# Patient Record
Sex: FEMALE | Race: WHITE | NOT HISPANIC OR LATINO | URBAN - METROPOLITAN AREA
[De-identification: names, ages, dates, MRNs, and addresses within clinical notes are randomized per-mention and may not be internally consistent; named-entity substitution may affect disease eponyms.]

---

## 2018-08-30 ENCOUNTER — EMERGENCY (EMERGENCY)
Facility: HOSPITAL | Age: 63
LOS: 1 days | Discharge: HOME | End: 2018-08-30
Admitting: EMERGENCY MEDICINE
Payer: MEDICAID

## 2018-08-30 VITALS
SYSTOLIC BLOOD PRESSURE: 145 MMHG | DIASTOLIC BLOOD PRESSURE: 81 MMHG | OXYGEN SATURATION: 98 % | HEART RATE: 70 BPM | TEMPERATURE: 98 F | RESPIRATION RATE: 18 BRPM

## 2018-08-30 DIAGNOSIS — F31.9 BIPOLAR DISORDER, UNSPECIFIED: ICD-10-CM

## 2018-08-30 PROCEDURE — 90792 PSYCH DIAG EVAL W/MED SRVCS: CPT

## 2018-08-30 PROCEDURE — 99284 EMERGENCY DEPT VISIT MOD MDM: CPT

## 2018-08-30 RX ADMIN — Medication 1 MILLIGRAM(S): at 16:32

## 2018-08-30 NOTE — ED PROVIDER NOTE - NS ED ROS FT
Yes Denies chest pain, SOB, N/V/D and fevers, Denies palpitations or diaphoresis. Denies Numbness, Tingling, Blurry Vision and HA.   Denies recent falls, trauma and injuries. Denies pain or any other medical complaints.

## 2018-08-30 NOTE — ED PROVIDER NOTE - MEDICAL DECISION MAKING DETAILS
This is a 63 year old Female PMHX MS Bipolar MDD  legally Blind  BIBIA with  Mac  for psych eval r/t suicidal ideations. Patient has had recent change in medication and eliot off lithium and Zyprexa and  changed to Wellbutrin and Prozac. Reports increased depression, suicidal thoughts and stats she lemus not want to be a burden to her family Medical evaluation performed. There is no clinical evidence of intoxication or any acute medical problem requiring immediate intervention. Final disposition will be determined by psychiatrist.

## 2018-08-30 NOTE — ED ADULT NURSE NOTE - OBJECTIVE STATEMENT
Pt received in  c/o psych eval, endorsed SI from alteration in medication, pt denies active SI at this time, denies plan/intent. pt denies HI&AH. Pt is calm and cooperative. denies ETOH and substance use. psych eval ongoing

## 2018-08-30 NOTE — ED PROVIDER NOTE - PROGRESS NOTE DETAILS
ILAN Bach patient anxious shaking Ativan 2mg/Haldol 5mg/Benadryl 50 mg IM x 1 1 mg po x 1 ordered  Labs WNL received via printout Lab results not on computer.

## 2018-08-30 NOTE — ED BEHAVIORAL HEALTH ASSESSMENT NOTE - PATIENT'S CHIEF COMPLAINT
" I don't feel well and I'm afraid I going to die." " I don't feel well and I'm afraid I going to die because of my med change."

## 2018-08-30 NOTE — ED PROVIDER NOTE - OBJECTIVE STATEMENT
This is a 63 year old Female PMHX MS Bipolar MDD  legally Blind  BIBIA with  Mac  for psych eval r/t suicidal ideations. Patient has had recent change in medication and eliot off lithium and Zyprexa and  changed to Wellbutrin and Prozac. Reports increased depression, suicidal thoughts and stats she lemus not want to be a burden to her family. Denies any SI plan or Si attempts Reports [facility] admission in 1992. Denies HI Denies AH/VH Denies ETOH/Illicit drugs

## 2018-08-30 NOTE — ED ADULT TRIAGE NOTE - CHIEF COMPLAINT QUOTE
Pt from home.  Transitioned from lithium to prozac and wellbutrin.  Here for SI.  No HI, drug/alcohol use.  Taking meds as prescribed.  No plan

## 2018-08-30 NOTE — ED BEHAVIORAL HEALTH ASSESSMENT NOTE - DETAILS
Legally blinded Report h/o chronic passive suicidal thoughts ( no intent or plan ) MS and tremors unavailable

## 2018-08-30 NOTE — ED BEHAVIORAL HEALTH ASSESSMENT NOTE - RISK ASSESSMENT
Current low imminent risk given denies any active suicidal ideation, h/o suicide attempt,  homicidal ideation, auditory/visual hallucinations, CAH , or manic sx. She is chronic moderate risk given her limited psychosocial support and proper medical f/u.

## 2018-08-30 NOTE — ED BEHAVIORAL HEALTH ASSESSMENT NOTE - SUMMARY
64 yo CF w/ reported h/o manic depression that was dx in the 80's. Pt states that she recently started seeing a new psychiatrist Dr. Jacques at [facility] and pt states that he switch her from Lithium. Zyprexa, and Trazodone which she had been doing well on and started her on Prozac and Wellbutrin by her new psychiatrist and since then she been more depressed and anxious. Pt denies any active suicidal ideation, h/o suicide attempt,  homicidal ideation, auditory/visual hallucinations, CAH , or manic sx.   Pt states that she just want to be on her old meds that were helping me and for my  to be mad at me. Pt stated that she is willing and would like to see another psychiatrist. Pt offered to f/u at crisis clinic which she accepted and stated that she doesn't want to be admitted. Pt also doesn't meet criteria for inpatient INVOL admission and is currently more appropriate for outpt care.   Pt reported no safety concern with discharge plan. Pt agree to call 911 and present to the nearest ER if symptoms return or worsen. 62 yo CF w/ reported h/o manic depression that was dx in the 80's. Pt states that she recently started seeing a new psychiatrist Dr. Jacques at [facility] and pt states that he switch her from Lithium. Zyprexa, and Trazodone which she had been doing well on and started her on Prozac and Wellbutrin by her new psychiatrist and since then she been more depressed and anxious. Pt denies any active suicidal ideation, h/o suicide attempt,  homicidal ideation, auditory/visual hallucinations, CAH , or manic sx.   Pt states that she just want to be on her old meds that were helping me and for my  to be mad at me. Pt stated that she is willing and would like to see another psychiatrist. Pt offered to f/u at crisis clinic which she accepted and stated that she doesn't want to be admitted. Pt also doesn't meet criteria for 9.39  inpatient admission and is currently more appropriate for outpatient care.   Pt reported no safety concern with discharge plan. Pt agree to call 911 and present to the nearest ER if symptoms return or worsen.

## 2018-08-30 NOTE — ED ADULT NURSE NOTE - NSIMPLEMENTINTERV_GEN_ALL_ED
Implemented All Fall with Harm Risk Interventions:  [place] to call system. Call bell, personal items and telephone within reach. Instruct patient to call for assistance. Room bathroom lighting operational. Non-slip footwear when patient is off stretcher. Physically safe environment: no spills, clutter or unnecessary equipment. Stretcher in lowest position, wheels locked, appropriate side rails in place. Provide visual cue, wrist band, yellow gown, etc. Monitor gait and stability. Monitor for mental status changes and reorient to person, place, and time. Review medications for side effects contributing to fall risk. Reinforce activity limits and safety measures with patient and family. Provide visual clues: red socks.

## 2018-08-30 NOTE — ED BEHAVIORAL HEALTH ASSESSMENT NOTE - DIFFERENTIAL
Bipolar depressive type vs MDD vs MS related mood d/o Bipolar depressive type vs MDD vs MS related mood d/o, r/o Neurocognitive d/o NOS

## 2018-08-30 NOTE — ED BEHAVIORAL HEALTH ASSESSMENT NOTE - SUICIDE PROTECTIVE FACTORS
Responsibility to family and others/Fear of death or dying due to pain/suffering/Identifies reasons for living/Future oriented/Supportive social network or family

## 2018-08-30 NOTE — ED BEHAVIORAL HEALTH ASSESSMENT NOTE - HPI (INCLUDE ILLNESS QUALITY, SEVERITY, DURATION, TIMING, CONTEXT, MODIFYING FACTORS, ASSOCIATED SIGNS AND SYMPTOMS)
62 yo CF w/ reported h/o manic depression that was dx in the 80's. Pt states that she recently started seeing a new psychiatrist Dr. Jacques at [facility] and pt states that he switch her from Lithium. Zyprexa, and Trazodone which she had been doing well on and started her on Prozac and Wellbuterin. Pt states since her new psychiatrist switch her meds she had been feeling horrible and afraid that she is going to die. Pt states that she had been on Lithium since 1987 and was doing well. Pt states her last psych admission was in 1995 ( can't remember the hospitals name ). Pt asked if she was having renal issues from Lithium or metabolic issues from her Zyprexa? Pt states, " not to my knowledge." Pt states that for years she has had off and on passive thoughts about hurting herself but has never attempted to act on it and has never really had the intent or a plan. Pt states that she would never really hurt herself and she is actually afraid that this med change is going to kill her.   Pt denies any currently suicidal ideation, h/o suicide attempt, homicidal ideation, auditory/visual hallucinations or manic sx. Pt does report depression, poor sleep, and anxiety. 64 yo CF w/ reported h/o manic depression that was dx in the 80's. Pt states that she [facility] and pt states that he switch her from Lithium. Zyprexa, and Trazodone which she had been doing well on and started her on Prozac and Wellbutrin. Pt states since her new psychiatrist switch her meds she had been feeling horrible and afraid that she is going to die. Pt states that she had been on Lithium since 1987 and was doing well. Pt states her last psych admission was in 1995 ( can't remember the hospitals name ). Pt asked if she was having renal issues from Lithium or metabolic issues from her Zyprexa? Pt states, " not to my knowledge." Pt states that for years she has had off and on passive thoughts about hurting herself but has never attempted to act on it and has never really had the intent or a plan. Pt states that she would never really hurt herself and she is actually afraid that this med change is going to kill her.   Pt denies any currently suicidal ideation, h/o suicide attempt, homicidal ideation, auditory/visual hallucinations or manic sx. Pt does report depression, poor sleep, and anxiety.    Psychiatric ROS:  Depression: States + depressed mood, states no change in appetite, states  + decreased energy, states no problems with concentration, states no change in interests.  Cecilia: States no hyperreligious, states no grandiosity, states + decreased need for sleep, but reports no flight of ideas, states no distractibility, states no sustained irritability or euphoria, states no hyper -talkativeness  Anxiety: States + anxiety but no panic attacks.     Psychotic: States no auditory and visual hallucinations.  States no thought insertion/withdrawal/broadcasting.  States no ideas of reference.  SI: States no current suicidal ideation , no intent, no plan  HI: States no homicidal ideation 62 yo CF w/ reported h/o manic depression that was dx in the 80's. Pt states that she recently started seeing a new psychiatrist Dr. Jacques at [facility] and pt states that he switch her from Lithium. Zyprexa, and Trazodone which she had been doing well on and started her on Prozac and Wellbutrin. Pt states since her new psychiatrist switch her meds she had been feeling horrible and afraid that she is going to die. Pt states that she had been on Lithium since 1987 and was doing well. Pt states her last psych admission was in 1995 ( can't remember the hospitals name ). Pt asked if she was having renal issues from Lithium or metabolic issues from her Zyprexa? Pt states, " not to my knowledge." Pt states that for years she has had off and on passive thoughts about hurting herself but has never attempted to act on it and has never really had the intent or a plan. Pt states that she would never really hurt herself and she is actually afraid that this med change is going to kill her.   Pt denies any currently suicidal ideation, h/o suicide attempt, homicidal ideation, auditory/visual hallucinations or manic sx. Pt does report depression, poor sleep, and anxiety.    Psychiatric ROS:  Depression: States + depressed mood, states no change in appetite, states  + decreased energy, states no problems with concentration, states no change in interests.  Cecilia: States no hyperreligious, states no grandiosity, states + decreased need for sleep, but reports no flight of ideas, states no distractibility, states no sustained irritability or euphoria, states no hyper -talkativeness  Anxiety: States + anxiety but no panic attacks.     Psychotic: States no auditory and visual hallucinations.  States no thought insertion/withdrawal/broadcasting.  States no ideas of reference.  SI: States no current suicidal ideation , no intent, no plan  HI: States no homicidal ideation    Per SW collateral: Patient is a 63 year old  female who presented to the ED after therapist called 911. Writer met with patient who provided phone number for her  / [phone].  Writer spoke to pt’s  Jean who provide the following information.  Patient resides with  and two daughters.  Patient started with a new psychiatrist one month ago at [facility].  Patient was taken off Lithium and started on Prozac and Remeron but complained of constipation and was changed to Prozac and Wellbutrin.  Patient thought she was going to die of constipation and had medication changed.  Patient went to [facility] 8/23/18 and was told to go see her outpatient doctor 8/24/18.  Patient saw her doctor on 8/24/18 and was reportedly talking about suicidality, but did not have a plan and was sent home.  Today patient was supposed to have an appointment with a therapist João and refused to go so 911 was activated.  He states he sees patient sleeping at night but then she complains of not sleeping at night.  Patient has never been a big eater and after the incident of constipation has been more focused on eating foods high in fiber.  Patient has an admission to [facility] 7 months 24 years ago.  Patient reportedly had suicide attempt by cutting approximately 24 years ago and nothing since.  He states patient is legally blind and has MS.  He was informed patient currently does not want an inpatient psychiatric admission and does not meet criteria for involuntary inpatient psychiatric admission.  Pt’s  understands and was informed patient requests alternate outpatient psychiatrist.  Pt’s  was informed they can use [employer] walk in at [facility] or walk in at [facility].  He states he will transport patient home when ready.

## 2018-08-30 NOTE — ED BEHAVIORAL HEALTH ASSESSMENT NOTE - DESCRIPTION
Legally blind and MS Lives with  and daughter Calm and cooperative   Vital Signs Last 24 Hrs  T(C): 36.5 (30 Aug 2018 12:18), Max: 36.5 (30 Aug 2018 12:18)  T(F): 97.7 (30 Aug 2018 12:18), Max: 97.7 (30 Aug 2018 12:18)  HR: 70 (30 Aug 2018 12:18) (70 - 70)  BP: 145/81 (30 Aug 2018 12:18) (145/81 - 145/81)  BP(mean): --  RR: 18 (30 Aug 2018 12:18) (18 - 18)  SpO2: 98% (30 Aug 2018 12:18) (98% - 98%) Calm, polite, and cooperative throughout the interview and BH evaluation  Vital Signs Last 24 Hrs  T(C): 36.5 (30 Aug 2018 12:18), Max: 36.5 (30 Aug 2018 12:18)  T(F): 97.7 (30 Aug 2018 12:18), Max: 97.7 (30 Aug 2018 12:18)  HR: 70 (30 Aug 2018 12:18) (70 - 70)  BP: 145/81 (30 Aug 2018 12:18) (145/81 - 145/81)  BP(mean): --  RR: 18 (30 Aug 2018 12:18) (18 - 18)  SpO2: 98% (30 Aug 2018 12:18) (98% - 98%)

## 2018-08-31 LAB
ALBUMIN SERPL ELPH-MCNC: 4.2 G/DL — SIGNIFICANT CHANGE UP (ref 3.3–5)
ALP SERPL-CCNC: 114 U/L — SIGNIFICANT CHANGE UP (ref 40–120)
ALT FLD-CCNC: 26 U/L — SIGNIFICANT CHANGE UP (ref 4–33)
AMPHET UR-MCNC: NEGATIVE — SIGNIFICANT CHANGE UP
APAP SERPL-MCNC: < 15 UG/ML — LOW (ref 15–25)
APPEARANCE UR: SIGNIFICANT CHANGE UP
AST SERPL-CCNC: 16 U/L — SIGNIFICANT CHANGE UP (ref 4–32)
BACTERIA # UR AUTO: HIGH
BARBITURATES UR SCN-MCNC: NEGATIVE — SIGNIFICANT CHANGE UP
BASOPHILS # BLD AUTO: 0.02 K/UL — SIGNIFICANT CHANGE UP (ref 0–0.2)
BASOPHILS NFR BLD AUTO: 0.3 % — SIGNIFICANT CHANGE UP (ref 0–2)
BENZODIAZ UR-MCNC: NEGATIVE — SIGNIFICANT CHANGE UP
BILIRUB SERPL-MCNC: 0.4 MG/DL — SIGNIFICANT CHANGE UP (ref 0.2–1.2)
BILIRUB UR-MCNC: NEGATIVE — SIGNIFICANT CHANGE UP
BLOOD UR QL VISUAL: SIGNIFICANT CHANGE UP
BUN SERPL-MCNC: 7 MG/DL — SIGNIFICANT CHANGE UP (ref 7–23)
CALCIUM SERPL-MCNC: 9.4 MG/DL — SIGNIFICANT CHANGE UP (ref 8.4–10.5)
CANNABINOIDS UR-MCNC: NEGATIVE — SIGNIFICANT CHANGE UP
CHLORIDE SERPL-SCNC: 104 MMOL/L — SIGNIFICANT CHANGE UP (ref 98–107)
CO2 SERPL-SCNC: 24 MMOL/L — SIGNIFICANT CHANGE UP (ref 22–31)
COCAINE METAB.OTHER UR-MCNC: NEGATIVE — SIGNIFICANT CHANGE UP
COLOR SPEC: SIGNIFICANT CHANGE UP
CREAT SERPL-MCNC: 0.92 MG/DL — SIGNIFICANT CHANGE UP (ref 0.5–1.3)
EOSINOPHIL # BLD AUTO: 0.04 K/UL — SIGNIFICANT CHANGE UP (ref 0–0.5)
EOSINOPHIL NFR BLD AUTO: 0.7 % — SIGNIFICANT CHANGE UP (ref 0–6)
ETHANOL BLD-MCNC: < 10 MG/DL — SIGNIFICANT CHANGE UP
GLUCOSE SERPL-MCNC: 98 MG/DL — SIGNIFICANT CHANGE UP (ref 70–99)
GLUCOSE UR-MCNC: NEGATIVE — SIGNIFICANT CHANGE UP
HCT VFR BLD CALC: 43.1 % — SIGNIFICANT CHANGE UP (ref 34.5–45)
HGB BLD-MCNC: 14.1 G/DL — SIGNIFICANT CHANGE UP (ref 11.5–15.5)
HYALINE CASTS # UR AUTO: NEGATIVE — SIGNIFICANT CHANGE UP
IMM GRANULOCYTES # BLD AUTO: 0.02 # — SIGNIFICANT CHANGE UP
IMM GRANULOCYTES NFR BLD AUTO: 0.3 % — SIGNIFICANT CHANGE UP (ref 0–1.5)
KETONES UR-MCNC: NEGATIVE — SIGNIFICANT CHANGE UP
LEUKOCYTE ESTERASE UR-ACNC: SIGNIFICANT CHANGE UP
LYMPHOCYTES # BLD AUTO: 1.6 K/UL — SIGNIFICANT CHANGE UP (ref 1–3.3)
LYMPHOCYTES # BLD AUTO: 26.7 % — SIGNIFICANT CHANGE UP (ref 13–44)
MCHC RBC-ENTMCNC: 27.9 PG — SIGNIFICANT CHANGE UP (ref 27–34)
MCHC RBC-ENTMCNC: 32.7 % — SIGNIFICANT CHANGE UP (ref 32–36)
MCV RBC AUTO: 85.2 FL — SIGNIFICANT CHANGE UP (ref 80–100)
METHADONE UR-MCNC: NEGATIVE — SIGNIFICANT CHANGE UP
MONOCYTES # BLD AUTO: 0.53 K/UL — SIGNIFICANT CHANGE UP (ref 0–0.9)
MONOCYTES NFR BLD AUTO: 8.8 % — SIGNIFICANT CHANGE UP (ref 2–14)
NEUTROPHILS # BLD AUTO: 3.78 K/UL — SIGNIFICANT CHANGE UP (ref 1.8–7.4)
NEUTROPHILS NFR BLD AUTO: 63.2 % — SIGNIFICANT CHANGE UP (ref 43–77)
NITRITE UR-MCNC: NEGATIVE — SIGNIFICANT CHANGE UP
NRBC # FLD: 0 — SIGNIFICANT CHANGE UP
OPIATES UR-MCNC: NEGATIVE — SIGNIFICANT CHANGE UP
OXYCODONE UR-MCNC: NEGATIVE — SIGNIFICANT CHANGE UP
PCP UR-MCNC: NEGATIVE — SIGNIFICANT CHANGE UP
PH UR: 6.5 — SIGNIFICANT CHANGE UP (ref 5–8)
PLATELET # BLD AUTO: 246 K/UL — SIGNIFICANT CHANGE UP (ref 150–400)
PMV BLD: 9.1 FL — SIGNIFICANT CHANGE UP (ref 7–13)
POTASSIUM SERPL-MCNC: 4 MMOL/L — SIGNIFICANT CHANGE UP (ref 3.5–5.3)
POTASSIUM SERPL-SCNC: 4 MMOL/L — SIGNIFICANT CHANGE UP (ref 3.5–5.3)
PROT SERPL-MCNC: 7.3 G/DL — SIGNIFICANT CHANGE UP (ref 6–8.3)
PROT UR-MCNC: NEGATIVE — SIGNIFICANT CHANGE UP
RBC # BLD: 5.06 M/UL — SIGNIFICANT CHANGE UP (ref 3.8–5.2)
RBC # FLD: 13.7 % — SIGNIFICANT CHANGE UP (ref 10.3–14.5)
RBC CASTS # UR COMP ASSIST: SIGNIFICANT CHANGE UP (ref 0–?)
SALICYLATES SERPL-MCNC: < 5 MG/DL — LOW (ref 15–30)
SODIUM SERPL-SCNC: 142 MMOL/L — SIGNIFICANT CHANGE UP (ref 135–145)
SP GR SPEC: 1 — LOW (ref 1–1.04)
SQUAMOUS # UR AUTO: SIGNIFICANT CHANGE UP
TSH SERPL-MCNC: 1.11 UIU/ML — SIGNIFICANT CHANGE UP (ref 0.27–4.2)
UROBILINOGEN FLD QL: NORMAL — SIGNIFICANT CHANGE UP
WBC # BLD: 5.99 K/UL — SIGNIFICANT CHANGE UP (ref 3.8–10.5)
WBC # FLD AUTO: 5.99 K/UL — SIGNIFICANT CHANGE UP (ref 3.8–10.5)
WBC UR QL: >50 — HIGH (ref 0–?)

## 2024-01-28 NOTE — ED PROVIDER NOTE - PSYCHIATRIC MOOD
Per pt drinks 2 24oz twisted tea/smirinoff per day (family notes pt drinks more than this)  Drinks since brother  3 years ago. Has stopped for a week or two without symptoms. No DTs.   [facility] protocol discontinued    Plan:   Continue Thiamine and Folate   Cessation counseling recommended    appropriate/DEPRESSED